# Patient Record
Sex: FEMALE | Employment: FULL TIME | URBAN - METROPOLITAN AREA
[De-identification: names, ages, dates, MRNs, and addresses within clinical notes are randomized per-mention and may not be internally consistent; named-entity substitution may affect disease eponyms.]

---

## 2019-07-28 ENCOUNTER — OFFICE VISIT (OUTPATIENT)
Dept: URGENT CARE | Facility: PHYSICIAN GROUP | Age: 34
End: 2019-07-28
Payer: COMMERCIAL

## 2019-07-28 VITALS
HEIGHT: 67 IN | OXYGEN SATURATION: 98 % | DIASTOLIC BLOOD PRESSURE: 68 MMHG | TEMPERATURE: 97.4 F | BODY MASS INDEX: 23.54 KG/M2 | WEIGHT: 150 LBS | SYSTOLIC BLOOD PRESSURE: 100 MMHG | HEART RATE: 80 BPM
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
## 2019-07-28 DIAGNOSIS — M79.89 SWELLING OF LEFT HAND: ICD-10-CM

## 2019-07-28 DIAGNOSIS — S60.562A INSECT BITE OF LEFT HAND, INITIAL ENCOUNTER: ICD-10-CM

## 2019-07-28 DIAGNOSIS — L53.9 HAND ERYTHEMA: ICD-10-CM

## 2019-07-28 DIAGNOSIS — M79.642 PAIN IN LEFT HAND: ICD-10-CM

## 2019-07-28 PROCEDURE — 99204 OFFICE O/P NEW MOD 45 MIN: CPT | Performed by: NURSE PRACTITIONER

## 2019-07-28 RX ORDER — AMOXICILLIN 500 MG/1
500 CAPSULE ORAL 3 TIMES DAILY
Qty: 21 CAP | Refills: 0 | Status: SHIPPED | OUTPATIENT
Start: 2019-07-28 | End: 2019-08-04

## 2019-07-28 ASSESSMENT — ENCOUNTER SYMPTOMS
RESPIRATORY NEGATIVE: 1
CARDIOVASCULAR NEGATIVE: 1
WEAKNESS: 0
SENSORY CHANGE: 0
FEVER: 0
CHILLS: 0
NEUROLOGICAL NEGATIVE: 1
FOCAL WEAKNESS: 0
SHORTNESS OF BREATH: 0
NUMBNESS: 0
CONSTITUTIONAL NEGATIVE: 1
TINGLING: 0

## 2019-07-28 NOTE — PROGRESS NOTES
"Subjective:     Mirella Hunt is a 34 y.o. female who presents for Bug Bite (wasp sting x 1 day, redness, swelling, pain)       Hand Injury   This is a new problem. The current episode started yesterday. The problem has been gradually worsening. Pertinent negatives include no chest pain, chills, fever, numbness or weakness.     Patient reports that yesterday she was clearing out what she thought were bees when she was stung on her dorsal L hand by a wasp. She reports having felt immediate pain. Reports increasing redness and swelling beyond an area which she marked. Denies SOB, fever, chills, throat swelling, or other symptoms. Prior therapy: NSAIDs.    PMH:  has no past medical history on file.    MEDS:   Current Outpatient Prescriptions:   •  amoxicillin (AMOXIL) 500 MG Cap, Take 1 Cap by mouth 3 times a day for 7 days., Disp: 21 Cap, Rfl: 0    ALLERGIES: No Known Allergies    SURGHX: No past surgical history on file.    SOCHX:  reports that she has never smoked. She has never used smokeless tobacco. She reports that she drinks alcohol. She reports that she does not use drugs.     FH: Reviewed with patient, not pertinent to this visit.    Review of Systems   Constitutional: Negative.  Negative for chills, fever and malaise/fatigue.   Respiratory: Negative.  Negative for shortness of breath.    Cardiovascular: Negative.  Negative for chest pain.   Skin:        Left hand redness, swelling, pain, worsening   Neurological: Negative.  Negative for tingling, sensory change, focal weakness, weakness and numbness.   All other systems reviewed and are negative.    Objective:     /68   Pulse 80   Temp 36.3 °C (97.4 °F) (Temporal)   Ht 1.702 m (5' 7\")   Wt 68 kg (150 lb)   LMP 07/21/2019   SpO2 98%   BMI 23.49 kg/m²     Physical Exam   Constitutional: She is oriented to person, place, and time. She appears well-developed and well-nourished. She is cooperative.  Non-toxic appearance. No distress.   HENT:   Right " Ear: External ear normal.   Left Ear: External ear normal.   Nose: Nose normal.   Mouth/Throat: Mucous membranes are normal.   Eyes: Conjunctivae and EOM are normal.   Neck: Normal range of motion.   Cardiovascular: Normal rate, regular rhythm, normal heart sounds and intact distal pulses.    Pulmonary/Chest: Effort normal and breath sounds normal. No respiratory distress. She has no decreased breath sounds.   Abdominal: Bowel sounds are normal.   Musculoskeletal: Normal range of motion. She exhibits no deformity.        Left hand: She exhibits tenderness and swelling. She exhibits normal range of motion, normal capillary refill and no deformity. Normal sensation noted. Normal strength noted.   Dorsal L hand: approx 3 x 3 cm area of erythema, edema, TTP, warmth with small puncture in center from wasp bite, no stinger visible, no discharge; NVI   Neurological: She is alert and oriented to person, place, and time. She has normal strength. No sensory deficit.   Skin: Skin is warm and dry.   Psychiatric: She has a normal mood and affect. Her behavior is normal.   Vitals reviewed.       Assessment/Plan:     1. Insect bite of left hand, initial encounter  - amoxicillin (AMOXIL) 500 MG Cap; Take 1 Cap by mouth 3 times a day for 7 days.  Dispense: 21 Cap; Refill: 0    2. Swelling of left hand  - amoxicillin (AMOXIL) 500 MG Cap; Take 1 Cap by mouth 3 times a day for 7 days.  Dispense: 21 Cap; Refill: 0    3. Pain in left hand  - amoxicillin (AMOXIL) 500 MG Cap; Take 1 Cap by mouth 3 times a day for 7 days.  Dispense: 21 Cap; Refill: 0    4. Hand erythema  - amoxicillin (AMOXIL) 500 MG Cap; Take 1 Cap by mouth 3 times a day for 7 days.  Dispense: 21 Cap; Refill: 0    Discussed differentials of left hand redness, pain, and swelling after wasp bite including local venom reaction vs cellulitis.    Contingent antibiotic sent electronically to pharmacy if symptoms do not improve tomorrow.    Patient advised to monitor for signs of   infection including, but not limited to, increased redness, warmth, pain, swelling, discharge, or fever.    Discussed close monitoring and supportive measures including increasing fluids and rest as well as OTC symptom management including acetaminophen and/or ibuprofen PRN pain. May also use OTC antihistamine, ice application, and elevation.    Patient advised to: Return for 1) Symptoms don't improve or worsen, or go to ER, 2) Follow up with primary care in 7-10 days.    Differential diagnosis, natural history, supportive care, and indications for immediate follow-up discussed. All questions answered. Patient agrees with the plan of care.

## 2019-07-28 NOTE — PATIENT INSTRUCTIONS
Insect Bite, Adult  An insect bite can make your skin red, itchy, and swollen. An insect bite is different from an insect sting, which happens when an insect injects poison (venom) into the skin.  Some insects can spread disease to people through a bite. However, most insect bites do not lead to disease and are not serious.  What are the causes?  Insects may bite for a variety of reasons, including:  · Hunger.  · To defend themselves.  Insects that bite include:  · Spiders.  · Mosquitoes.  · Ticks.  · Fleas.  · Ants.  · Flies.  · Bedbugs.  What are the signs or symptoms?  Symptoms of this condition include:  · Itching or pain in the bite area.  · Redness and swelling in the bite area.  · An open wound (skin ulcer).  In many cases, symptoms last for 2-4 days.  How is this diagnosed?  This condition is usually diagnosed based on symptoms and a physical exam.  How is this treated?  Treatment is usually not needed. Symptoms often go away on their own. When treatment is recommended, it may involve:  · Applying a cream or lotion to the bitten area. This treatment helps with itching.  · Taking an antibiotic medicine. This treatment is needed if the bite area gets infected.  · Getting a tetanus shot.  · Applying ice to the affected area.  · Medicines called antihistamines. This treatment is needed if you develop an allergic reaction to the insect bite.  Follow these instructions at home:  Bite area care  · Do not scratch the bite area.  · Keep the bite area clean and dry. Wash it every day with soap and water as told by your health care provider.  · Check the bite area every day for signs of infection. Check for:  ¨ More redness, swelling, or pain.  ¨ Fluid or blood.  ¨ Warmth.  ¨ Pus.  Managing pain, itching, and swelling  · You may apply a baking soda paste, cortisone cream, or calamine lotion to the bite area as told by your health care provider.  · If directed, apply ice to the bite area.  ¨ Put ice in a plastic  bag.  ¨ Place a towel between your skin and the bag.  ¨ Leave the ice on for 20 minutes, 2-3 times per day.  Medicines  · Apply or take over-the-counter and prescription medicines only as told by your health care provider.  · If you were prescribed an antibiotic medicine, use it as told by your health care provider. Do not stop using the antibiotic even if your condition improves.  General instructions  · Keep all follow-up visits as told by your health care provider. This is important.  How is this prevented?  To help reduce your risk of insect bites:  · When you are outdoors, wear clothing that covers your arms and legs.  · Use insect repellent. The best insect repellents contain:  ¨ DEET, picaridin, oil of lemon eucalyptus (OLE), or [id number].  ¨ Higher amounts of an active ingredient.  · If your home windows do not have screens, consider installing them.  Contact a health care provider if:  · You have more redness, swelling, or pain in the bite area.  · You have fluid, blood, or pus coming from the bite area.  · The bite area feels warm to the touch.  · You have a fever.  Get help right away if:  · You have joint pain.  · You have a rash.  · You have shortness of breath.  · You feel unusually tired or sleepy.  · You have neck pain.  · You have a headache.  · You have unusual weakness.  · You have chest pain.  · You have nausea, vomiting, or pain in the abdomen.  This information is not intended to replace advice given to you by your health care provider. Make sure you discuss any questions you have with your health care provider.  Document Released: 01/25/2006 Document Revised: 08/16/2017 Document Reviewed: 06/26/2017  [employer] Interactive Patient Education © 2017 [employer]